# Patient Record
Sex: FEMALE | Race: WHITE | NOT HISPANIC OR LATINO
[De-identification: names, ages, dates, MRNs, and addresses within clinical notes are randomized per-mention and may not be internally consistent; named-entity substitution may affect disease eponyms.]

---

## 2018-02-09 ENCOUNTER — APPOINTMENT (OUTPATIENT)
Dept: ULTRASOUND IMAGING | Facility: CLINIC | Age: 43
End: 2018-02-09

## 2018-02-09 ENCOUNTER — APPOINTMENT (OUTPATIENT)
Dept: MAMMOGRAPHY | Facility: CLINIC | Age: 43
End: 2018-02-09

## 2018-03-02 ENCOUNTER — APPOINTMENT (OUTPATIENT)
Dept: MAMMOGRAPHY | Facility: CLINIC | Age: 43
End: 2018-03-02
Payer: COMMERCIAL

## 2018-03-02 ENCOUNTER — APPOINTMENT (OUTPATIENT)
Dept: ULTRASOUND IMAGING | Facility: CLINIC | Age: 43
End: 2018-03-02
Payer: COMMERCIAL

## 2018-03-02 ENCOUNTER — OUTPATIENT (OUTPATIENT)
Dept: OUTPATIENT SERVICES | Facility: HOSPITAL | Age: 43
LOS: 1 days | End: 2018-03-02
Payer: COMMERCIAL

## 2018-03-02 DIAGNOSIS — Z00.8 ENCOUNTER FOR OTHER GENERAL EXAMINATION: ICD-10-CM

## 2018-03-02 PROCEDURE — 76641 ULTRASOUND BREAST COMPLETE: CPT

## 2018-03-02 PROCEDURE — 77067 SCR MAMMO BI INCL CAD: CPT

## 2018-03-02 PROCEDURE — 77067 SCR MAMMO BI INCL CAD: CPT | Mod: 26

## 2018-03-02 PROCEDURE — 77063 BREAST TOMOSYNTHESIS BI: CPT | Mod: 26

## 2018-03-02 PROCEDURE — 76641 ULTRASOUND BREAST COMPLETE: CPT | Mod: 26,50

## 2018-03-02 PROCEDURE — 77063 BREAST TOMOSYNTHESIS BI: CPT

## 2019-03-21 ENCOUNTER — APPOINTMENT (OUTPATIENT)
Dept: MAMMOGRAPHY | Facility: CLINIC | Age: 44
End: 2019-03-21
Payer: COMMERCIAL

## 2019-03-21 ENCOUNTER — OUTPATIENT (OUTPATIENT)
Dept: OUTPATIENT SERVICES | Facility: HOSPITAL | Age: 44
LOS: 1 days | End: 2019-03-21
Payer: COMMERCIAL

## 2019-03-21 ENCOUNTER — APPOINTMENT (OUTPATIENT)
Dept: ULTRASOUND IMAGING | Facility: CLINIC | Age: 44
End: 2019-03-21
Payer: COMMERCIAL

## 2019-03-21 DIAGNOSIS — Z00.8 ENCOUNTER FOR OTHER GENERAL EXAMINATION: ICD-10-CM

## 2019-03-21 PROCEDURE — 76641 ULTRASOUND BREAST COMPLETE: CPT | Mod: 26,50

## 2019-03-21 PROCEDURE — 77067 SCR MAMMO BI INCL CAD: CPT

## 2019-03-21 PROCEDURE — 77067 SCR MAMMO BI INCL CAD: CPT | Mod: 26

## 2019-03-21 PROCEDURE — 77063 BREAST TOMOSYNTHESIS BI: CPT

## 2019-03-21 PROCEDURE — 76641 ULTRASOUND BREAST COMPLETE: CPT

## 2019-03-21 PROCEDURE — 77063 BREAST TOMOSYNTHESIS BI: CPT | Mod: 26

## 2020-05-28 ENCOUNTER — OUTPATIENT (OUTPATIENT)
Dept: OUTPATIENT SERVICES | Facility: HOSPITAL | Age: 45
LOS: 1 days | End: 2020-05-28
Payer: COMMERCIAL

## 2020-05-28 ENCOUNTER — APPOINTMENT (OUTPATIENT)
Dept: MAMMOGRAPHY | Facility: CLINIC | Age: 45
End: 2020-05-28

## 2020-05-28 ENCOUNTER — APPOINTMENT (OUTPATIENT)
Dept: ULTRASOUND IMAGING | Facility: CLINIC | Age: 45
End: 2020-05-28

## 2020-05-28 DIAGNOSIS — Z00.8 ENCOUNTER FOR OTHER GENERAL EXAMINATION: ICD-10-CM

## 2020-05-28 PROCEDURE — 76641 ULTRASOUND BREAST COMPLETE: CPT | Mod: 26,50

## 2020-05-28 PROCEDURE — 77063 BREAST TOMOSYNTHESIS BI: CPT

## 2020-05-28 PROCEDURE — 76641 ULTRASOUND BREAST COMPLETE: CPT

## 2020-05-28 PROCEDURE — 77063 BREAST TOMOSYNTHESIS BI: CPT | Mod: 26

## 2020-05-28 PROCEDURE — 77067 SCR MAMMO BI INCL CAD: CPT

## 2020-05-28 PROCEDURE — 77067 SCR MAMMO BI INCL CAD: CPT | Mod: 26

## 2020-10-21 ENCOUNTER — NON-APPOINTMENT (OUTPATIENT)
Age: 45
End: 2020-10-21

## 2021-02-23 ENCOUNTER — TRANSCRIPTION ENCOUNTER (OUTPATIENT)
Age: 46
End: 2021-02-23

## 2021-05-27 ENCOUNTER — OUTPATIENT (OUTPATIENT)
Dept: OUTPATIENT SERVICES | Facility: HOSPITAL | Age: 46
LOS: 1 days | End: 2021-05-27
Payer: COMMERCIAL

## 2021-05-27 ENCOUNTER — APPOINTMENT (OUTPATIENT)
Dept: MRI IMAGING | Facility: CLINIC | Age: 46
End: 2021-05-27
Payer: COMMERCIAL

## 2021-05-27 DIAGNOSIS — Z00.8 ENCOUNTER FOR OTHER GENERAL EXAMINATION: ICD-10-CM

## 2021-05-27 PROCEDURE — A9585: CPT

## 2021-05-27 PROCEDURE — 70553 MRI BRAIN STEM W/O & W/DYE: CPT | Mod: 26

## 2021-05-27 PROCEDURE — 70553 MRI BRAIN STEM W/O & W/DYE: CPT

## 2021-06-03 ENCOUNTER — APPOINTMENT (OUTPATIENT)
Dept: MAMMOGRAPHY | Facility: CLINIC | Age: 46
End: 2021-06-03
Payer: COMMERCIAL

## 2021-06-03 ENCOUNTER — OUTPATIENT (OUTPATIENT)
Dept: OUTPATIENT SERVICES | Facility: HOSPITAL | Age: 46
LOS: 1 days | End: 2021-06-03
Payer: COMMERCIAL

## 2021-06-03 ENCOUNTER — TRANSCRIPTION ENCOUNTER (OUTPATIENT)
Age: 46
End: 2021-06-03

## 2021-06-03 ENCOUNTER — APPOINTMENT (OUTPATIENT)
Dept: ULTRASOUND IMAGING | Facility: CLINIC | Age: 46
End: 2021-06-03
Payer: COMMERCIAL

## 2021-06-03 DIAGNOSIS — Z00.8 ENCOUNTER FOR OTHER GENERAL EXAMINATION: ICD-10-CM

## 2021-06-03 PROCEDURE — 76641 ULTRASOUND BREAST COMPLETE: CPT | Mod: 26,50

## 2021-06-03 PROCEDURE — 77067 SCR MAMMO BI INCL CAD: CPT | Mod: 26

## 2021-06-03 PROCEDURE — 77063 BREAST TOMOSYNTHESIS BI: CPT

## 2021-06-03 PROCEDURE — 77067 SCR MAMMO BI INCL CAD: CPT

## 2021-06-03 PROCEDURE — 76641 ULTRASOUND BREAST COMPLETE: CPT

## 2021-06-03 PROCEDURE — 77063 BREAST TOMOSYNTHESIS BI: CPT | Mod: 26

## 2021-09-17 ENCOUNTER — APPOINTMENT (OUTPATIENT)
Dept: INTERNAL MEDICINE | Facility: CLINIC | Age: 46
End: 2021-09-17

## 2021-10-27 ENCOUNTER — APPOINTMENT (OUTPATIENT)
Dept: INTERNAL MEDICINE | Facility: CLINIC | Age: 46
End: 2021-10-27
Payer: COMMERCIAL

## 2021-10-27 ENCOUNTER — LABORATORY RESULT (OUTPATIENT)
Age: 46
End: 2021-10-27

## 2021-10-27 ENCOUNTER — NON-APPOINTMENT (OUTPATIENT)
Age: 46
End: 2021-10-27

## 2021-10-27 VITALS
DIASTOLIC BLOOD PRESSURE: 96 MMHG | SYSTOLIC BLOOD PRESSURE: 136 MMHG | OXYGEN SATURATION: 98 % | HEART RATE: 76 BPM | WEIGHT: 222 LBS | HEIGHT: 67 IN | BODY MASS INDEX: 34.84 KG/M2

## 2021-10-27 PROCEDURE — 36415 COLL VENOUS BLD VENIPUNCTURE: CPT

## 2021-10-27 PROCEDURE — 96127 BRIEF EMOTIONAL/BEHAV ASSMT: CPT

## 2021-10-27 PROCEDURE — 99396 PREV VISIT EST AGE 40-64: CPT | Mod: 25

## 2021-10-27 PROCEDURE — 93000 ELECTROCARDIOGRAM COMPLETE: CPT

## 2021-10-27 NOTE — HEALTH RISK ASSESSMENT
[No] : In the past 12 months have you used drugs other than those required for medical reasons? No [0] : 2) Feeling down, depressed, or hopeless: Not at all (0) [Patient reported mammogram was normal] : Patient reported mammogram was normal [Patient reported PAP Smear was normal] : Patient reported PAP Smear was normal [No falls in past year] : Patient reported no falls in the past year [PHQ-2 Negative - No further assessment needed] : PHQ-2 Negative - No further assessment needed [I have developed a follow-up plan documented below in the note.] : I have developed a follow-up plan documented below in the note. [None] : None [With Family] : lives with family [] :  [Fully functional (bathing, dressing, toileting, transferring, walking, feeding)] : Fully functional (bathing, dressing, toileting, transferring, walking, feeding) [Fully functional (using the telephone, shopping, preparing meals, housekeeping, doing laundry, using] : Fully functional and needs no help or supervision to perform IADLs (using the telephone, shopping, preparing meals, housekeeping, doing laundry, using transportation, managing medications and managing finances) [Smoke Detector] : smoke detector [Carbon Monoxide Detector] : carbon monoxide detector [Seat Belt] :  uses seat belt [Sunscreen] : uses sunscreen [With Patient/Caregiver] : , with patient/caregiver [] : No [de-identified] : walking [de-identified] : reg [[id number]] : 0 [EyeExamDate] : 08/2021 [Change in mental status noted] : No change in mental status noted [Reports changes in dental health] : Reports no changes in dental health [MammogramDate] : 06/2021 [PapSmearDate] : 06/2021 [AdvancecareDate] : 10/27/21

## 2021-10-28 ENCOUNTER — NON-APPOINTMENT (OUTPATIENT)
Age: 46
End: 2021-10-28

## 2021-10-28 ENCOUNTER — TRANSCRIPTION ENCOUNTER (OUTPATIENT)
Age: 46
End: 2021-10-28

## 2021-10-28 LAB
25(OH)D3 SERPL-MCNC: 43 NG/ML
ALBUMIN SERPL ELPH-MCNC: 4.5 G/DL
ALP BLD-CCNC: 69 U/L
ALT SERPL-CCNC: 10 U/L
ANION GAP SERPL CALC-SCNC: 11 MMOL/L
APPEARANCE: CLEAR
AST SERPL-CCNC: 15 U/L
B BURGDOR IGG+IGM SER QL IB: NORMAL
BACTERIA: NEGATIVE
BASOPHILS # BLD AUTO: 0.03 K/UL
BASOPHILS NFR BLD AUTO: 0.4 %
BILIRUB SERPL-MCNC: 0.4 MG/DL
BILIRUBIN URINE: NEGATIVE
BLOOD URINE: NEGATIVE
BUN SERPL-MCNC: 14 MG/DL
CALCIUM SERPL-MCNC: 9.7 MG/DL
CHLORIDE SERPL-SCNC: 106 MMOL/L
CHOLEST SERPL-MCNC: 209 MG/DL
CO2 SERPL-SCNC: 22 MMOL/L
COLOR: YELLOW
CREAT SERPL-MCNC: 0.99 MG/DL
EOSINOPHIL # BLD AUTO: 0.05 K/UL
EOSINOPHIL NFR BLD AUTO: 0.7 %
FSH SERPL-MCNC: 4.6 IU/L
GLUCOSE QUALITATIVE U: NEGATIVE
GLUCOSE SERPL-MCNC: 86 MG/DL
HCG SERPL QL: NEGATIVE
HCT VFR BLD CALC: 41.3 %
HDLC SERPL-MCNC: 61 MG/DL
HGB BLD-MCNC: 13.6 G/DL
HYALINE CASTS: 2 /LPF
IMM GRANULOCYTES NFR BLD AUTO: 0.1 %
KETONES URINE: NEGATIVE
LDLC SERPL CALC-MCNC: 118 MG/DL
LEUKOCYTE ESTERASE URINE: NEGATIVE
LYMPHOCYTES # BLD AUTO: 1.84 K/UL
LYMPHOCYTES NFR BLD AUTO: 26.3 %
MAN DIFF?: NORMAL
MCHC RBC-ENTMCNC: 29.2 PG
MCHC RBC-ENTMCNC: 32.9 GM/DL
MCV RBC AUTO: 88.6 FL
MICROSCOPIC-UA: NORMAL
MONOCYTES # BLD AUTO: 0.34 K/UL
MONOCYTES NFR BLD AUTO: 4.9 %
NEUTROPHILS # BLD AUTO: 4.72 K/UL
NEUTROPHILS NFR BLD AUTO: 67.6 %
NITRITE URINE: NEGATIVE
NONHDLC SERPL-MCNC: 149 MG/DL
PAPP-A SERPL-ACNC: <1 MIU/ML
PH URINE: 5.5
PLATELET # BLD AUTO: 293 K/UL
POTASSIUM SERPL-SCNC: 4.6 MMOL/L
PROGEST SERPL-MCNC: 0.2 NG/ML
PROT SERPL-MCNC: 7 G/DL
PROTEIN URINE: NEGATIVE
RBC # BLD: 4.66 M/UL
RBC # FLD: 12.8 %
RED BLOOD CELLS URINE: 1 /HPF
SODIUM SERPL-SCNC: 139 MMOL/L
SPECIFIC GRAVITY URINE: 1.02
SQUAMOUS EPITHELIAL CELLS: 2 /HPF
T4 SERPL-MCNC: 7.5 UG/DL
TRIGL SERPL-MCNC: 155 MG/DL
TSH SERPL-ACNC: 1.41 UIU/ML
UROBILINOGEN URINE: NORMAL
WBC # FLD AUTO: 6.99 K/UL
WHITE BLOOD CELLS URINE: 1 /HPF

## 2021-10-31 LAB
HCV AB SER QL: NONREACTIVE
HCV S/CO RATIO: 0.12 S/CO

## 2021-11-01 LAB — ESTROGEN SERPL-MCNC: 52 PG/ML

## 2021-12-09 ENCOUNTER — APPOINTMENT (OUTPATIENT)
Dept: INTERNAL MEDICINE | Facility: CLINIC | Age: 46
End: 2021-12-09

## 2021-12-21 DIAGNOSIS — Z78.9 OTHER SPECIFIED HEALTH STATUS: ICD-10-CM

## 2022-01-05 ENCOUNTER — APPOINTMENT (OUTPATIENT)
Dept: INTERNAL MEDICINE | Facility: CLINIC | Age: 47
End: 2022-01-05
Payer: COMMERCIAL

## 2022-01-05 VITALS — SYSTOLIC BLOOD PRESSURE: 136 MMHG | DIASTOLIC BLOOD PRESSURE: 84 MMHG

## 2022-01-05 VITALS
HEART RATE: 82 BPM | SYSTOLIC BLOOD PRESSURE: 120 MMHG | WEIGHT: 222 LBS | BODY MASS INDEX: 34.84 KG/M2 | DIASTOLIC BLOOD PRESSURE: 78 MMHG | HEIGHT: 67 IN | OXYGEN SATURATION: 99 %

## 2022-01-05 PROCEDURE — 99214 OFFICE O/P EST MOD 30 MIN: CPT

## 2022-01-05 RX ORDER — NORETHINDRONE ACETATE AND ETHINYL ESTRADIOL AND FERROUS FUMARATE 1MG-20(21)
1-20 KIT ORAL
Qty: 28 | Refills: 5 | Status: DISCONTINUED | COMMUNITY
Start: 2021-10-27 | End: 2022-01-05

## 2022-01-05 NOTE — HISTORY OF PRESENT ILLNESS
[FreeTextEntry1] : Pt here for follow up on blood pressure tolerates meds  no headaches no  dizziness

## 2022-02-03 ENCOUNTER — TRANSCRIPTION ENCOUNTER (OUTPATIENT)
Age: 47
End: 2022-02-03

## 2022-02-03 ENCOUNTER — NON-APPOINTMENT (OUTPATIENT)
Age: 47
End: 2022-02-03

## 2022-02-03 ENCOUNTER — APPOINTMENT (OUTPATIENT)
Dept: FAMILY MEDICINE | Facility: CLINIC | Age: 47
End: 2022-02-03

## 2022-02-17 ENCOUNTER — RX RENEWAL (OUTPATIENT)
Age: 47
End: 2022-02-17

## 2022-03-01 ENCOUNTER — APPOINTMENT (OUTPATIENT)
Dept: FAMILY MEDICINE | Facility: CLINIC | Age: 47
End: 2022-03-01

## 2022-03-01 ENCOUNTER — TRANSCRIPTION ENCOUNTER (OUTPATIENT)
Age: 47
End: 2022-03-01

## 2022-03-04 ENCOUNTER — TRANSCRIPTION ENCOUNTER (OUTPATIENT)
Age: 47
End: 2022-03-04

## 2022-03-08 ENCOUNTER — NON-APPOINTMENT (OUTPATIENT)
Age: 47
End: 2022-03-08

## 2022-03-08 ENCOUNTER — TRANSCRIPTION ENCOUNTER (OUTPATIENT)
Age: 47
End: 2022-03-08

## 2022-03-29 ENCOUNTER — APPOINTMENT (OUTPATIENT)
Dept: INTERNAL MEDICINE | Facility: CLINIC | Age: 47
End: 2022-03-29
Payer: COMMERCIAL

## 2022-03-29 VITALS
TEMPERATURE: 98 F | SYSTOLIC BLOOD PRESSURE: 134 MMHG | HEART RATE: 80 BPM | DIASTOLIC BLOOD PRESSURE: 74 MMHG | OXYGEN SATURATION: 99 % | HEIGHT: 67 IN | BODY MASS INDEX: 35.63 KG/M2 | WEIGHT: 227 LBS

## 2022-03-29 VITALS — SYSTOLIC BLOOD PRESSURE: 128 MMHG | DIASTOLIC BLOOD PRESSURE: 72 MMHG

## 2022-03-29 PROCEDURE — 99214 OFFICE O/P EST MOD 30 MIN: CPT

## 2022-03-29 RX ORDER — DESOGESTREL/ETHINYL ESTRADIOL AND ETHINYL ESTRADIOL 21-5 (28)
KIT ORAL
Refills: 0 | Status: ACTIVE | COMMUNITY

## 2022-06-29 ENCOUNTER — APPOINTMENT (OUTPATIENT)
Dept: INTERNAL MEDICINE | Facility: CLINIC | Age: 47
End: 2022-06-29
Payer: COMMERCIAL

## 2022-06-29 VITALS
WEIGHT: 231 LBS | HEART RATE: 63 BPM | SYSTOLIC BLOOD PRESSURE: 122 MMHG | TEMPERATURE: 98.5 F | DIASTOLIC BLOOD PRESSURE: 70 MMHG | OXYGEN SATURATION: 99 % | HEIGHT: 67 IN | BODY MASS INDEX: 36.26 KG/M2

## 2022-06-29 VITALS — SYSTOLIC BLOOD PRESSURE: 132 MMHG | DIASTOLIC BLOOD PRESSURE: 80 MMHG

## 2022-06-29 PROCEDURE — 36415 COLL VENOUS BLD VENIPUNCTURE: CPT

## 2022-06-29 PROCEDURE — 99214 OFFICE O/P EST MOD 30 MIN: CPT | Mod: 25

## 2022-06-29 NOTE — HISTORY OF PRESENT ILLNESS
[FreeTextEntry1] : Pt is here for follow up of multiple medical problems including\par htn,elevated bmi   and anxiety

## 2022-07-12 ENCOUNTER — LABORATORY RESULT (OUTPATIENT)
Age: 47
End: 2022-07-12

## 2022-07-13 ENCOUNTER — NON-APPOINTMENT (OUTPATIENT)
Age: 47
End: 2022-07-13

## 2022-07-13 LAB
ALBUMIN SERPL ELPH-MCNC: 4.3 G/DL
ANION GAP SERPL CALC-SCNC: 13 MMOL/L
BUN SERPL-MCNC: 14 MG/DL
CALCIUM SERPL-MCNC: 9.3 MG/DL
CCP AB SER IA-ACNC: <8 UNITS
CHLORIDE SERPL-SCNC: 104 MMOL/L
CO2 SERPL-SCNC: 20 MMOL/L
CREAT SERPL-MCNC: 0.97 MG/DL
CRP SERPL-MCNC: 5 MG/L
EGFR: 73 ML/MIN/1.73M2
ERYTHROCYTE [SEDIMENTATION RATE] IN BLOOD BY WESTERGREN METHOD: 16 MM/HR
ESTIMATED AVERAGE GLUCOSE: 117 MG/DL
GLUCOSE SERPL-MCNC: 71 MG/DL
HBA1C MFR BLD HPLC: 5.7 %
PHOSPHATE SERPL-MCNC: 2.9 MG/DL
POTASSIUM SERPL-SCNC: 4.3 MMOL/L
RF+CCP IGG SER-IMP: NEGATIVE
RHEUMATOID FACT SER QL: <10 IU/ML
SODIUM SERPL-SCNC: 137 MMOL/L

## 2022-07-17 LAB
ANA PAT FLD IF-IMP: ABNORMAL
ANA SER IF-ACNC: ABNORMAL

## 2022-07-27 ENCOUNTER — RX RENEWAL (OUTPATIENT)
Age: 47
End: 2022-07-27

## 2022-07-29 ENCOUNTER — OUTPATIENT (OUTPATIENT)
Dept: OUTPATIENT SERVICES | Facility: HOSPITAL | Age: 47
LOS: 1 days | End: 2022-07-29
Payer: COMMERCIAL

## 2022-07-29 ENCOUNTER — APPOINTMENT (OUTPATIENT)
Dept: MAMMOGRAPHY | Facility: CLINIC | Age: 47
End: 2022-07-29

## 2022-07-29 ENCOUNTER — APPOINTMENT (OUTPATIENT)
Dept: ULTRASOUND IMAGING | Facility: CLINIC | Age: 47
End: 2022-07-29

## 2022-07-29 DIAGNOSIS — Z00.8 ENCOUNTER FOR OTHER GENERAL EXAMINATION: ICD-10-CM

## 2022-07-29 PROCEDURE — 76641 ULTRASOUND BREAST COMPLETE: CPT | Mod: 26,50

## 2022-07-29 PROCEDURE — 77063 BREAST TOMOSYNTHESIS BI: CPT

## 2022-07-29 PROCEDURE — 77063 BREAST TOMOSYNTHESIS BI: CPT | Mod: 26

## 2022-07-29 PROCEDURE — 77067 SCR MAMMO BI INCL CAD: CPT

## 2022-07-29 PROCEDURE — 77067 SCR MAMMO BI INCL CAD: CPT | Mod: 26

## 2022-07-29 PROCEDURE — 76641 ULTRASOUND BREAST COMPLETE: CPT

## 2022-08-17 ENCOUNTER — NON-APPOINTMENT (OUTPATIENT)
Age: 47
End: 2022-08-17

## 2022-10-31 ENCOUNTER — RX RENEWAL (OUTPATIENT)
Age: 47
End: 2022-10-31

## 2022-11-03 ENCOUNTER — NON-APPOINTMENT (OUTPATIENT)
Age: 47
End: 2022-11-03

## 2022-11-03 ENCOUNTER — APPOINTMENT (OUTPATIENT)
Dept: INTERNAL MEDICINE | Facility: CLINIC | Age: 47
End: 2022-11-03

## 2022-11-03 VITALS
WEIGHT: 230 LBS | DIASTOLIC BLOOD PRESSURE: 78 MMHG | HEIGHT: 67 IN | HEART RATE: 84 BPM | SYSTOLIC BLOOD PRESSURE: 120 MMHG | TEMPERATURE: 98.4 F | OXYGEN SATURATION: 99 % | BODY MASS INDEX: 36.1 KG/M2

## 2022-11-03 DIAGNOSIS — N91.2 AMENORRHEA, UNSPECIFIED: ICD-10-CM

## 2022-11-03 DIAGNOSIS — Z80.6 FAMILY HISTORY OF LEUKEMIA: ICD-10-CM

## 2022-11-03 DIAGNOSIS — Z80.42 FAMILY HISTORY OF MALIGNANT NEOPLASM OF PROSTATE: ICD-10-CM

## 2022-11-03 DIAGNOSIS — U07.1 COVID-19: ICD-10-CM

## 2022-11-03 DIAGNOSIS — M25.50 PAIN IN UNSPECIFIED JOINT: ICD-10-CM

## 2022-11-03 DIAGNOSIS — R42 DIZZINESS AND GIDDINESS: ICD-10-CM

## 2022-11-03 PROCEDURE — G0444 DEPRESSION SCREEN ANNUAL: CPT | Mod: NC,59

## 2022-11-03 PROCEDURE — 90715 TDAP VACCINE 7 YRS/> IM: CPT

## 2022-11-03 PROCEDURE — 93000 ELECTROCARDIOGRAM COMPLETE: CPT | Mod: 59

## 2022-11-03 PROCEDURE — 36415 COLL VENOUS BLD VENIPUNCTURE: CPT

## 2022-11-03 PROCEDURE — 90471 IMMUNIZATION ADMIN: CPT

## 2022-11-03 PROCEDURE — 99396 PREV VISIT EST AGE 40-64: CPT | Mod: 25

## 2022-11-03 NOTE — HEALTH RISK ASSESSMENT
[Never] : Never [No] : In the past 12 months have you used drugs other than those required for medical reasons? No [0] : 2) Feeling down, depressed, or hopeless: Not at all (0) [No falls in past year] : Patient reported no falls in the past year [PHQ-2 Negative - No further assessment needed] : PHQ-2 Negative - No further assessment needed [None] : None [With Family] : lives with family [Smoke Detector] : smoke detector [Carbon Monoxide Detector] : carbon monoxide detector [Seat Belt] :  uses seat belt [Sunscreen] : uses sunscreen [With Patient/Caregiver] : , with patient/caregiver [de-identified] : kick boxing [de-identified] : reg [[id number]] : 0 [EyeExamDate] : 08/2022 [Change in mental status noted] : No change in mental status noted [MammogramDate] : 04/2022 [PapSmearDate] : 04/2022 [AdvancecareDate] : 11/3/22

## 2022-11-06 LAB
25(OH)D3 SERPL-MCNC: 39.1 NG/ML
ALBUMIN SERPL ELPH-MCNC: 4.4 G/DL
ALP BLD-CCNC: 74 U/L
ALT SERPL-CCNC: 11 U/L
ANION GAP SERPL CALC-SCNC: 10 MMOL/L
APPEARANCE: CLEAR
AST SERPL-CCNC: 13 U/L
BACTERIA: NEGATIVE
BASOPHILS # BLD AUTO: 0.02 K/UL
BASOPHILS NFR BLD AUTO: 0.3 %
BILIRUB DIRECT SERPL-MCNC: 0.1 MG/DL
BILIRUB SERPL-MCNC: 0.4 MG/DL
BILIRUBIN URINE: NEGATIVE
BLOOD URINE: NEGATIVE
BUN SERPL-MCNC: 13 MG/DL
CALCIUM SERPL-MCNC: 9.4 MG/DL
CHLORIDE SERPL-SCNC: 103 MMOL/L
CHOLEST SERPL-MCNC: 194 MG/DL
CO2 SERPL-SCNC: 23 MMOL/L
COLOR: NORMAL
CREAT SERPL-MCNC: 0.95 MG/DL
EGFR: 75 ML/MIN/1.73M2
EOSINOPHIL # BLD AUTO: 0.03 K/UL
EOSINOPHIL NFR BLD AUTO: 0.5 %
ESTIMATED AVERAGE GLUCOSE: 111 MG/DL
FERRITIN SERPL-MCNC: 52 NG/ML
GLUCOSE QUALITATIVE U: NEGATIVE
GLUCOSE SERPL-MCNC: 86 MG/DL
HBA1C MFR BLD HPLC: 5.5 %
HCT VFR BLD CALC: 40.9 %
HCV AB SER QL: NONREACTIVE
HCV S/CO RATIO: 0.14 S/CO
HDLC SERPL-MCNC: 54 MG/DL
HGB BLD-MCNC: 13.3 G/DL
HYALINE CASTS: 2 /LPF
IMM GRANULOCYTES NFR BLD AUTO: 0.3 %
IRON SERPL-MCNC: 94 UG/DL
KETONES URINE: NEGATIVE
LDLC SERPL CALC-MCNC: 111 MG/DL
LEUKOCYTE ESTERASE URINE: NEGATIVE
LYMPHOCYTES # BLD AUTO: 2.16 K/UL
LYMPHOCYTES NFR BLD AUTO: 34.1 %
MAN DIFF?: NORMAL
MCHC RBC-ENTMCNC: 28.7 PG
MCHC RBC-ENTMCNC: 32.5 GM/DL
MCV RBC AUTO: 88.1 FL
MICROSCOPIC-UA: NORMAL
MONOCYTES # BLD AUTO: 0.34 K/UL
MONOCYTES NFR BLD AUTO: 5.4 %
NEUTROPHILS # BLD AUTO: 3.76 K/UL
NEUTROPHILS NFR BLD AUTO: 59.4 %
NITRITE URINE: NEGATIVE
NONHDLC SERPL-MCNC: 141 MG/DL
PH URINE: 6
PLATELET # BLD AUTO: 281 K/UL
POTASSIUM SERPL-SCNC: 4.4 MMOL/L
PROT SERPL-MCNC: 6.6 G/DL
PROTEIN URINE: NEGATIVE
RBC # BLD: 4.64 M/UL
RBC # FLD: 12.6 %
RED BLOOD CELLS URINE: 1 /HPF
SODIUM SERPL-SCNC: 137 MMOL/L
SPECIFIC GRAVITY URINE: 1.02
SQUAMOUS EPITHELIAL CELLS: 2 /HPF
T4 SERPL-MCNC: 6.9 UG/DL
TRIGL SERPL-MCNC: 148 MG/DL
TSH SERPL-ACNC: 1.81 UIU/ML
UROBILINOGEN URINE: NORMAL
WBC # FLD AUTO: 6.33 K/UL
WHITE BLOOD CELLS URINE: 2 /HPF

## 2022-11-09 ENCOUNTER — NON-APPOINTMENT (OUTPATIENT)
Age: 47
End: 2022-11-09

## 2022-11-21 ENCOUNTER — RESULT REVIEW (OUTPATIENT)
Age: 47
End: 2022-11-21

## 2022-12-19 ENCOUNTER — APPOINTMENT (OUTPATIENT)
Dept: RHEUMATOLOGY | Facility: CLINIC | Age: 47
End: 2022-12-19

## 2022-12-19 VITALS
OXYGEN SATURATION: 99 % | BODY MASS INDEX: 34.84 KG/M2 | TEMPERATURE: 97.1 F | WEIGHT: 222 LBS | DIASTOLIC BLOOD PRESSURE: 70 MMHG | HEART RATE: 104 BPM | SYSTOLIC BLOOD PRESSURE: 110 MMHG | HEIGHT: 67 IN

## 2022-12-19 DIAGNOSIS — M54.2 CERVICALGIA: ICD-10-CM

## 2022-12-19 DIAGNOSIS — R79.82 ELEVATED C-REACTIVE PROTEIN (CRP): ICD-10-CM

## 2022-12-19 DIAGNOSIS — M54.50 LOW BACK PAIN, UNSPECIFIED: ICD-10-CM

## 2022-12-19 DIAGNOSIS — M25.649 STIFFNESS OF UNSPECIFIED HAND, NOT ELSEWHERE CLASSIFIED: ICD-10-CM

## 2022-12-19 DIAGNOSIS — M19.041 PRIMARY OSTEOARTHRITIS, RIGHT HAND: ICD-10-CM

## 2022-12-19 DIAGNOSIS — M23.8X1 OTHER INTERNAL DERANGEMENTS OF RIGHT KNEE: ICD-10-CM

## 2022-12-19 DIAGNOSIS — M19.042 PRIMARY OSTEOARTHRITIS, RIGHT HAND: ICD-10-CM

## 2022-12-19 DIAGNOSIS — M25.569 PAIN IN UNSPECIFIED KNEE: ICD-10-CM

## 2022-12-19 DIAGNOSIS — M23.8X2 OTHER INTERNAL DERANGEMENTS OF RIGHT KNEE: ICD-10-CM

## 2022-12-19 DIAGNOSIS — R70.0 ELEVATED ERYTHROCYTE SEDIMENTATION RATE: ICD-10-CM

## 2022-12-19 PROCEDURE — 99205 OFFICE O/P NEW HI 60 MIN: CPT

## 2022-12-19 NOTE — PHYSICAL EXAM
[General Appearance - Alert] : alert [General Appearance - In No Acute Distress] : in no acute distress [Sclera] : the sclera and conjunctiva were normal [Extraocular Movements] : extraocular movements were intact [Outer Ear] : the ears and nose were normal in appearance [Neck Appearance] : the appearance of the neck was normal [Respiration, Rhythm And Depth] : normal respiratory rhythm and effort [Heart Rate And Rhythm] : heart rate was normal and rhythm regular [Heart Sounds] : normal S1 and S2 [Abdomen Soft] : soft [Abdomen Tenderness] : non-tender [Cervical Lymph Nodes Enlarged Anterior Bilaterally] : anterior cervical [Supraclavicular Lymph Nodes Enlarged Bilaterally] : supraclavicular [No CVA Tenderness] : no ~M costovertebral angle tenderness [Motor Tone] : muscle strength and tone were normal [] : no rash [No Focal Deficits] : no focal deficits [Impaired Insight] : insight and judgment were intact [Mood] : the mood was normal [FreeTextEntry1] : mild heberden node at DIPs; No synovitis or effusion on exam noted today; Good ROM in b/l shoulders, no pelvic/girdle stiffness and able to stand up without using her hands

## 2022-12-19 NOTE — ASSESSMENT
[FreeTextEntry1] : 47-year-old female, here for the first time w/ mild OA hands; w/ +GINGER 1:160 Speckled; mildly high ESR=16; mildly high CRP=5 reports of intermittent stiffness in the b/l hands, knees, neck and LBP to rule out inflammatory arthropathy incld RA, seronegative spondyloarthropathy.\par -No synovitis or effusion on exam noted today and advised to monitor.\par -reviewed labs 11/3/22 CMP ok; 7/12/22 w/ +GINGER 1:160 Speckled; mildly high ESR=16; mildly high CRP=5; RF negative; CCP negative; lyme IgM Negative; lyme IgG + (reports she was treated for lyme in the past)\par -labs as below incld ESR, CRP, serologies, TSH\par -Referred for xray of b/l hands to evaluate for structural changes, r/o periarticular osteopenia/RA, r/o erosions\par \par +GINGER 1:160 Speckled\par -Clinically without much symptoms of CTD/lupus/sjogren syndrome at this time and educated on symptoms to monitor for in detail if she evolves.\par -lab as below w/ disease activity markers as below to be complete\par -will check thyroid abs as discussed +GINGER can be seen with cross reactivity\par \par Mildly Raised ESR/CRP\par -denies any signs of infection now; no synovitis; denies any wt loss or night sweats and knows to stay up to date on health maintenance w/ PCP please \par -will monitor on repeat\par \par Cervicalgia: tenderness in c-spine w/ rotation w/ good ROM w/o paresthesias \par -Referred for xray of c-spine to evaluate for structural changes, DDD; muscle spasm \par \par Intermittent Knee pain -has crepitus in b/l knees and referred for xray of b/l knees to evaluate for structural changes, OA\par -consider steroid injections if pain \par \par LBP: intermittent \par -Referred for xray of L spine & SI to evaluate for structural changes, DDD, confirm SI normal \par -Advil PRN w/ food needed sparingly helps\par \par -educated on symptoms to monitor for in detail and alert us if any concerns.\par -knows to stay up to date on health maintenance w/ PCP\par -f/u in 10-14 days w/ labs, xrays please\par

## 2022-12-19 NOTE — HISTORY OF PRESENT ILLNESS
[FreeTextEntry1] : 47-year-old female, here for the first time with +GINGER 1:160 Speckled; mildly high ESR=16; mildly high CRP=5.\par Patient states she can notice some intermittent stiffness in her joints for the past 1-1/2 years.\par Patient states she can notice some intermittent stiffness in her bilateral hands that is not on a daily basis.\par States the stiffness is less than 30 minutes.\par States she will monitor for any swelling of the joints.\par States she can notice intermittent neck pain with rotation without paresthesias at this time.\par States she notices intermittent knee pain with going up and down the stairs.  Denies any crystal arthropathy like attacks.\par States she notices lower back pain worse with sitting or standing; better with resting. Denies any loss of bladder or bowel incontinence or saddle anesthesias.\par Denies any fever/chills, no rashes, no ulcers, no dry eyes, no dry mouth, no raynaud's, no infectious diarrhea or  symptoms at this time.\par Denies any history of blood clots; no stroke; 2 pregnancies; no miscarriages.

## 2022-12-21 LAB
ACE BLD-CCNC: 32 U/L
ALBUMIN SERPL ELPH-MCNC: 4.5 G/DL
ALP BLD-CCNC: 80 U/L
ALT SERPL-CCNC: 12 U/L
ANA SER IF-ACNC: NEGATIVE
ANION GAP SERPL CALC-SCNC: 12 MMOL/L
AST SERPL-CCNC: 16 U/L
BASOPHILS # BLD AUTO: 0.03 K/UL
BASOPHILS NFR BLD AUTO: 0.5 %
BILIRUB SERPL-MCNC: 0.3 MG/DL
BUN SERPL-MCNC: 19 MG/DL
C3 SERPL-MCNC: 152 MG/DL
C4 SERPL-MCNC: 25 MG/DL
CALCIUM SERPL-MCNC: 9.9 MG/DL
CCP AB SER IA-ACNC: <8 UNITS
CHLORIDE SERPL-SCNC: 101 MMOL/L
CO2 SERPL-SCNC: 24 MMOL/L
CREAT SERPL-MCNC: 0.91 MG/DL
CREAT SPEC-SCNC: 180 MG/DL
CREAT/PROT UR: 0.1 RATIO
CRP SERPL-MCNC: 11 MG/L
DSDNA AB SER-ACNC: <12 IU/ML
EGFR: 79 ML/MIN/1.73M2
ENA RNP AB SER IA-ACNC: <0.2 AL
ENA SM AB SER IA-ACNC: <0.2 AL
ENA SS-A AB SER IA-ACNC: <0.2 AL
ENA SS-B AB SER IA-ACNC: <0.2 AL
EOSINOPHIL # BLD AUTO: 0.04 K/UL
EOSINOPHIL NFR BLD AUTO: 0.6 %
ERYTHROCYTE [SEDIMENTATION RATE] IN BLOOD BY WESTERGREN METHOD: 23 MM/HR
GLUCOSE SERPL-MCNC: 91 MG/DL
HAV IGM SER QL: NONREACTIVE
HBV CORE IGM SER QL: NONREACTIVE
HBV SURFACE AG SER QL: NONREACTIVE
HCT VFR BLD CALC: 41.6 %
HCV AB SER QL: NONREACTIVE
HCV S/CO RATIO: 0.12 S/CO
HGB BLD-MCNC: 13.9 G/DL
HIV1+2 AB SPEC QL IA.RAPID: NONREACTIVE
IMM GRANULOCYTES NFR BLD AUTO: 0.2 %
LYMPHOCYTES # BLD AUTO: 1.6 K/UL
LYMPHOCYTES NFR BLD AUTO: 25.7 %
MAN DIFF?: NORMAL
MCHC RBC-ENTMCNC: 28.7 PG
MCHC RBC-ENTMCNC: 33.4 GM/DL
MCV RBC AUTO: 85.8 FL
MONOCYTES # BLD AUTO: 0.33 K/UL
MONOCYTES NFR BLD AUTO: 5.3 %
NEUTROPHILS # BLD AUTO: 4.21 K/UL
NEUTROPHILS NFR BLD AUTO: 67.7 %
PLATELET # BLD AUTO: 322 K/UL
POTASSIUM SERPL-SCNC: 4.7 MMOL/L
PROT SERPL-MCNC: 7.2 G/DL
PROT UR-MCNC: 14 MG/DL
RBC # BLD: 4.85 M/UL
RBC # FLD: 12.8 %
RF+CCP IGG SER-IMP: NEGATIVE
SODIUM SERPL-SCNC: 137 MMOL/L
THYROGLOB AB SERPL-ACNC: <20 IU/ML
THYROPEROXIDASE AB SERPL IA-ACNC: 14 IU/ML
TSH SERPL-ACNC: 1.18 UIU/ML
WBC # FLD AUTO: 6.22 K/UL

## 2022-12-22 LAB
B19V IGG SER QL IA: 5.31 INDEX
B19V IGG+IGM SER-IMP: NORMAL
B19V IGG+IGM SER-IMP: POSITIVE
B19V IGM FLD-ACNC: 0.13 INDEX
B19V IGM SER-ACNC: NEGATIVE

## 2023-01-03 LAB
G6PD SER-CCNC: 15 U/G HGB
HLA-B27 RELATED AG QL: NEGATIVE

## 2023-02-08 ENCOUNTER — RX RENEWAL (OUTPATIENT)
Age: 48
End: 2023-02-08

## 2023-02-10 ENCOUNTER — NON-APPOINTMENT (OUTPATIENT)
Age: 48
End: 2023-02-10

## 2023-02-13 ENCOUNTER — APPOINTMENT (OUTPATIENT)
Dept: OTOLARYNGOLOGY | Facility: CLINIC | Age: 48
End: 2023-02-13
Payer: COMMERCIAL

## 2023-02-13 ENCOUNTER — NON-APPOINTMENT (OUTPATIENT)
Age: 48
End: 2023-02-13

## 2023-02-13 VITALS
DIASTOLIC BLOOD PRESSURE: 75 MMHG | SYSTOLIC BLOOD PRESSURE: 131 MMHG | WEIGHT: 220 LBS | HEIGHT: 67 IN | HEART RATE: 102 BPM | BODY MASS INDEX: 34.53 KG/M2

## 2023-02-13 DIAGNOSIS — H93.293 OTHER ABNORMAL AUDITORY PERCEPTIONS, BILATERAL: ICD-10-CM

## 2023-02-13 DIAGNOSIS — H61.21 IMPACTED CERUMEN, RIGHT EAR: ICD-10-CM

## 2023-02-13 DIAGNOSIS — H93.19 TINNITUS, UNSPECIFIED EAR: ICD-10-CM

## 2023-02-13 PROCEDURE — 99204 OFFICE O/P NEW MOD 45 MIN: CPT | Mod: 25

## 2023-02-13 PROCEDURE — 92557 COMPREHENSIVE HEARING TEST: CPT

## 2023-02-13 PROCEDURE — G0268 REMOVAL OF IMPACTED WAX MD: CPT

## 2023-02-13 PROCEDURE — 92567 TYMPANOMETRY: CPT

## 2023-02-13 PROCEDURE — 92625 TINNITUS ASSESSMENT: CPT

## 2023-02-13 NOTE — REVIEW OF SYSTEMS
[Dizziness] : dizziness [Vertigo] : vertigo [Ear Noises] : ear noises [Heartburn] : heartburn [Negative] : Heme/Lymph

## 2023-02-13 NOTE — ASSESSMENT
[FreeTextEntry1] : 48-year-old female with long history of nonpulsatile tinnitus for the past 3 to 4 years.  Worsens with stress and lack of sleep.  Had no prior history of recurring ear infections or prior surgery.  Previously underwent MRI ordered by another ENT.\par \par Otoscopic exam today shows intact bilateral tympanic membranes without effusion or retraction.  Bilateral facial nerve function is normal.  I personally ordered and reviewed an audiogram for her hearing loss, which shows a mild high-frequency left asymmetric sensorineural hearing loss.  I personally reviewed and interpreted prior MRI images and the report, which shows no IAC or CPA pathology.\par \par The association between hearing loss and tinnitus was discussed with the patient.  The manner in which tinnitus can be worsened by stress, anxiety, lack of sleep, and caffeine was discussed.  Masking and habituation strategies were discussed.  Use of sound therapies such as hearing aids, noise generators, combination devices, and music therapies were discussed.  Tinnitus handout was provided.  Recommended trial of notched sound therapy, may also consider music therapies through the hearing and tinnitus center.  Monitor hearing every few years.\par \par

## 2023-02-13 NOTE — DATA REVIEWED
[de-identified] : -  TYPE A TYMPS AU\par RIGHT: HEARING -8000HZ\par LEFT: HEARING WNL SLOPING TO A MILD SNHL 250-8000HZ\par *LEFT ASYMM 4-8KHZ; DUTTON RIGHT 3-4KHZ C/W LEFT SNHL ASYMM\par PITCH MATCH AT 12.5KHZ ~55 dB; PT REPORT OCCASIONAL HIGHER PITCH AT ~16KHZ

## 2023-03-09 ENCOUNTER — APPOINTMENT (OUTPATIENT)
Dept: RHEUMATOLOGY | Facility: CLINIC | Age: 48
End: 2023-03-09

## 2023-04-19 ENCOUNTER — APPOINTMENT (OUTPATIENT)
Dept: INTERNAL MEDICINE | Facility: CLINIC | Age: 48
End: 2023-04-19
Payer: COMMERCIAL

## 2023-04-19 VITALS
HEIGHT: 67 IN | BODY MASS INDEX: 31.39 KG/M2 | WEIGHT: 200 LBS | SYSTOLIC BLOOD PRESSURE: 122 MMHG | HEART RATE: 100 BPM | OXYGEN SATURATION: 98 % | TEMPERATURE: 97.1 F | DIASTOLIC BLOOD PRESSURE: 74 MMHG

## 2023-04-19 DIAGNOSIS — Z87.898 PERSONAL HISTORY OF OTHER SPECIFIED CONDITIONS: ICD-10-CM

## 2023-04-19 PROCEDURE — 99214 OFFICE O/P EST MOD 30 MIN: CPT | Mod: 25

## 2023-04-19 PROCEDURE — 36415 COLL VENOUS BLD VENIPUNCTURE: CPT

## 2023-04-19 NOTE — ASSESSMENT
[FreeTextEntry1] : 1. HTn \par BP controlled\par Continue Metoprolol ER 25 m g po Qd\par Limit sodium intake 2 gr daily \par renal panel ordered\par \par 2. Obesity\par Encouraged to increase physical activity 150 minutes a week as tolerated \par Medical weight loss referral provided\par OV 3 months \par

## 2023-04-19 NOTE — HISTORY OF PRESENT ILLNESS
[FreeTextEntry1] : f/up  [de-identified] : Pt is a 48 yr old female with a h/ o of anxiety, GERD, vertigo, tinnitus, + GINGER. \par \par She recently saw rheumatology MD for + GINGER , per pt further workup was negative. \par She does experience stiffness in her hands at times , XRAY of nanci hands was ordered, pt declined. States it is " likely osteoarthritis from working on the computer. " \par \par She remains on Metoprolol ER 25 m g po Qd for elevated BP. Blood pressure today  in the office is 122/ 76 taken by myself. \par She denies headache, vision changes, lightheadedness. \par \par She is requesting referral for medical weight loss , she has been unable to lose weight . She does walk  occasionally and tried to watch what she eats.  \par Referred to [facility], provided contact information to pt. \par \par Pt denies fever, chills, chest pain, palpitations , Shortness  of breath.

## 2023-04-19 NOTE — REVIEW OF SYSTEMS
[Fever] : no fever [Chills] : no chills [Fatigue] : no fatigue [Joint Pain] : joint pain [Negative] : Neurological

## 2023-04-20 ENCOUNTER — NON-APPOINTMENT (OUTPATIENT)
Age: 48
End: 2023-04-20

## 2023-04-20 LAB
ALBUMIN SERPL ELPH-MCNC: 4.4 G/DL
ANION GAP SERPL CALC-SCNC: 13 MMOL/L
BUN SERPL-MCNC: 18 MG/DL
CALCIUM SERPL-MCNC: 9.7 MG/DL
CHLORIDE SERPL-SCNC: 103 MMOL/L
CO2 SERPL-SCNC: 23 MMOL/L
CREAT SERPL-MCNC: 0.94 MG/DL
EGFR: 76 ML/MIN/1.73M2
GLUCOSE SERPL-MCNC: 104 MG/DL
PHOSPHATE SERPL-MCNC: 3.4 MG/DL
POTASSIUM SERPL-SCNC: 4.6 MMOL/L
SODIUM SERPL-SCNC: 139 MMOL/L

## 2023-08-17 ENCOUNTER — APPOINTMENT (OUTPATIENT)
Dept: GASTROENTEROLOGY | Facility: CLINIC | Age: 48
End: 2023-08-17

## 2023-08-22 ENCOUNTER — OUTPATIENT (OUTPATIENT)
Dept: OUTPATIENT SERVICES | Facility: HOSPITAL | Age: 48
LOS: 1 days | End: 2023-08-22
Payer: COMMERCIAL

## 2023-08-22 ENCOUNTER — APPOINTMENT (OUTPATIENT)
Dept: ULTRASOUND IMAGING | Facility: CLINIC | Age: 48
End: 2023-08-22
Payer: COMMERCIAL

## 2023-08-22 ENCOUNTER — APPOINTMENT (OUTPATIENT)
Dept: MAMMOGRAPHY | Facility: CLINIC | Age: 48
End: 2023-08-22
Payer: COMMERCIAL

## 2023-08-22 DIAGNOSIS — Z00.8 ENCOUNTER FOR OTHER GENERAL EXAMINATION: ICD-10-CM

## 2023-08-22 PROCEDURE — 77067 SCR MAMMO BI INCL CAD: CPT | Mod: 26

## 2023-08-22 PROCEDURE — 77067 SCR MAMMO BI INCL CAD: CPT

## 2023-08-22 PROCEDURE — 77063 BREAST TOMOSYNTHESIS BI: CPT | Mod: 26

## 2023-08-22 PROCEDURE — 76641 ULTRASOUND BREAST COMPLETE: CPT

## 2023-08-22 PROCEDURE — 77063 BREAST TOMOSYNTHESIS BI: CPT

## 2023-08-22 PROCEDURE — 76641 ULTRASOUND BREAST COMPLETE: CPT | Mod: 26,50

## 2023-09-13 ENCOUNTER — RX RENEWAL (OUTPATIENT)
Age: 48
End: 2023-09-13

## 2023-10-12 ENCOUNTER — APPOINTMENT (OUTPATIENT)
Dept: BARIATRICS/WEIGHT MGMT | Facility: CLINIC | Age: 48
End: 2023-10-12
Payer: COMMERCIAL

## 2023-10-12 PROCEDURE — 99205 OFFICE O/P NEW HI 60 MIN: CPT | Mod: 95

## 2023-11-06 ENCOUNTER — NON-APPOINTMENT (OUTPATIENT)
Age: 48
End: 2023-11-06

## 2023-11-06 ENCOUNTER — APPOINTMENT (OUTPATIENT)
Dept: INTERNAL MEDICINE | Facility: CLINIC | Age: 48
End: 2023-11-06
Payer: COMMERCIAL

## 2023-11-06 VITALS
HEART RATE: 84 BPM | OXYGEN SATURATION: 99 % | WEIGHT: 230 LBS | HEIGHT: 67 IN | SYSTOLIC BLOOD PRESSURE: 124 MMHG | DIASTOLIC BLOOD PRESSURE: 78 MMHG | TEMPERATURE: 97.9 F | BODY MASS INDEX: 36.1 KG/M2

## 2023-11-06 DIAGNOSIS — H90.3 SENSORINEURAL HEARING LOSS, BILATERAL: ICD-10-CM

## 2023-11-06 DIAGNOSIS — R09.89 OTHER SPECIFIED SYMPTOMS AND SIGNS INVOLVING THE CIRCULATORY AND RESPIRATORY SYSTEMS: ICD-10-CM

## 2023-11-06 DIAGNOSIS — K21.9 GASTRO-ESOPHAGEAL REFLUX DISEASE W/OUT ESOPHAGITIS: ICD-10-CM

## 2023-11-06 DIAGNOSIS — R03.0 ELEVATED BLOOD-PRESSURE READING, W/OUT DIAGNOSIS OF HYPERTENSION: ICD-10-CM

## 2023-11-06 DIAGNOSIS — R76.8 OTHER SPECIFIED ABNORMAL IMMUNOLOGICAL FINDINGS IN SERUM: ICD-10-CM

## 2023-11-06 DIAGNOSIS — Z00.00 ENCOUNTER FOR GENERAL ADULT MEDICAL EXAMINATION W/OUT ABNORMAL FINDINGS: ICD-10-CM

## 2023-11-06 PROCEDURE — 36415 COLL VENOUS BLD VENIPUNCTURE: CPT

## 2023-11-06 PROCEDURE — 93000 ELECTROCARDIOGRAM COMPLETE: CPT

## 2023-11-06 PROCEDURE — 99396 PREV VISIT EST AGE 40-64: CPT | Mod: 25

## 2023-11-07 LAB
25(OH)D3 SERPL-MCNC: 36.5 NG/ML
ALBUMIN SERPL ELPH-MCNC: 4.2 G/DL
ALP BLD-CCNC: 85 U/L
ALT SERPL-CCNC: 14 U/L
ANION GAP SERPL CALC-SCNC: 11 MMOL/L
APPEARANCE: CLEAR
AST SERPL-CCNC: 12 U/L
BACTERIA: ABNORMAL /HPF
BASOPHILS # BLD AUTO: 0.02 K/UL
BASOPHILS NFR BLD AUTO: 0.3 %
BILIRUB DIRECT SERPL-MCNC: 0.1 MG/DL
BILIRUB SERPL-MCNC: 0.5 MG/DL
BILIRUBIN URINE: NEGATIVE
BLOOD URINE: NEGATIVE
BUN SERPL-MCNC: 14 MG/DL
CALCIUM SERPL-MCNC: 9.7 MG/DL
CAST: 0 /LPF
CHLORIDE SERPL-SCNC: 104 MMOL/L
CHOLEST SERPL-MCNC: 211 MG/DL
CO2 SERPL-SCNC: 23 MMOL/L
COLOR: YELLOW
CREAT SERPL-MCNC: 0.99 MG/DL
EGFR: 71 ML/MIN/1.73M2
EOSINOPHIL # BLD AUTO: 0.05 K/UL
EOSINOPHIL NFR BLD AUTO: 0.8 %
EPITHELIAL CELLS: 6 /HPF
ESTIMATED AVERAGE GLUCOSE: 111 MG/DL
FERRITIN SERPL-MCNC: 34 NG/ML
GLUCOSE QUALITATIVE U: NEGATIVE MG/DL
GLUCOSE SERPL-MCNC: 85 MG/DL
HBA1C MFR BLD HPLC: 5.5 %
HCT VFR BLD CALC: 41.4 %
HCV AB SER QL: NONREACTIVE
HCV S/CO RATIO: 0.07 S/CO
HDLC SERPL-MCNC: 62 MG/DL
HGB BLD-MCNC: 13.6 G/DL
IMM GRANULOCYTES NFR BLD AUTO: 0.2 %
IRON SERPL-MCNC: 116 UG/DL
KETONES URINE: NEGATIVE MG/DL
LDLC SERPL CALC-MCNC: 126 MG/DL
LEUKOCYTE ESTERASE URINE: NEGATIVE
LYMPHOCYTES # BLD AUTO: 1.99 K/UL
LYMPHOCYTES NFR BLD AUTO: 30.9 %
MAN DIFF?: NORMAL
MCHC RBC-ENTMCNC: 28.5 PG
MCHC RBC-ENTMCNC: 32.9 GM/DL
MCV RBC AUTO: 86.8 FL
MICROSCOPIC-UA: NORMAL
MONOCYTES # BLD AUTO: 0.31 K/UL
MONOCYTES NFR BLD AUTO: 4.8 %
NEUTROPHILS # BLD AUTO: 4.07 K/UL
NEUTROPHILS NFR BLD AUTO: 63 %
NITRITE URINE: NEGATIVE
NONHDLC SERPL-MCNC: 149 MG/DL
PH URINE: 5.5
PLATELET # BLD AUTO: 307 K/UL
POTASSIUM SERPL-SCNC: 4.7 MMOL/L
PROT SERPL-MCNC: 7.2 G/DL
PROTEIN URINE: NEGATIVE MG/DL
RBC # BLD: 4.77 M/UL
RBC # FLD: 13.2 %
RED BLOOD CELLS URINE: 0 /HPF
SODIUM SERPL-SCNC: 138 MMOL/L
SPECIFIC GRAVITY URINE: 1.02
T4 SERPL-MCNC: 8.8 UG/DL
TRIGL SERPL-MCNC: 131 MG/DL
TSH SERPL-ACNC: 1.27 UIU/ML
UROBILINOGEN URINE: 0.2 MG/DL
WBC # FLD AUTO: 6.45 K/UL
WHITE BLOOD CELLS URINE: 0 /HPF

## 2023-12-07 ENCOUNTER — APPOINTMENT (OUTPATIENT)
Dept: BARIATRICS/WEIGHT MGMT | Facility: CLINIC | Age: 48
End: 2023-12-07
Payer: COMMERCIAL

## 2023-12-07 ENCOUNTER — OUTPATIENT (OUTPATIENT)
Dept: OUTPATIENT SERVICES | Facility: HOSPITAL | Age: 48
LOS: 1 days | End: 2023-12-07
Payer: COMMERCIAL

## 2023-12-07 DIAGNOSIS — I10 ESSENTIAL (PRIMARY) HYPERTENSION: ICD-10-CM

## 2023-12-07 DIAGNOSIS — E66.9 OBESITY, UNSPECIFIED: ICD-10-CM

## 2023-12-07 PROCEDURE — 99215 OFFICE O/P EST HI 40 MIN: CPT | Mod: 95

## 2023-12-07 PROCEDURE — G0463: CPT

## 2023-12-22 DIAGNOSIS — E66.9 OBESITY, UNSPECIFIED: ICD-10-CM

## 2023-12-22 DIAGNOSIS — E78.5 HYPERLIPIDEMIA, UNSPECIFIED: ICD-10-CM

## 2023-12-27 ENCOUNTER — RX RENEWAL (OUTPATIENT)
Age: 48
End: 2023-12-27

## 2024-02-01 ENCOUNTER — RX CHANGE (OUTPATIENT)
Age: 49
End: 2024-02-01

## 2024-02-01 RX ORDER — METOPROLOL SUCCINATE 25 MG/1
25 TABLET, EXTENDED RELEASE ORAL
Qty: 30 | Refills: 2 | Status: DISCONTINUED | COMMUNITY
Start: 2022-01-05 | End: 2024-02-01

## 2024-02-08 NOTE — ASSESSMENT
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  Therefore the cultures are updated every 3 days, however we will need some additional lab work to narrow down the type of fungal ball this is.      If you have time to contact her that would be great, I will place the orders in this encounter for what we need.   [FreeTextEntry1] : All preventative measures were reviewed with the patient and the patient is due for and agrees to the following as outlined  in the plan  below.\par

## 2024-03-01 ENCOUNTER — APPOINTMENT (OUTPATIENT)
Dept: BARIATRICS/WEIGHT MGMT | Facility: CLINIC | Age: 49
End: 2024-03-01

## 2024-05-10 ENCOUNTER — RX RENEWAL (OUTPATIENT)
Age: 49
End: 2024-05-10

## 2024-05-29 ENCOUNTER — APPOINTMENT (OUTPATIENT)
Age: 49
End: 2024-05-29
Payer: COMMERCIAL

## 2024-06-05 ENCOUNTER — APPOINTMENT (OUTPATIENT)
Dept: INTERNAL MEDICINE | Facility: CLINIC | Age: 49
End: 2024-06-05
Payer: COMMERCIAL

## 2024-06-05 ENCOUNTER — NON-APPOINTMENT (OUTPATIENT)
Age: 49
End: 2024-06-05

## 2024-06-05 VITALS
TEMPERATURE: 97.7 F | HEIGHT: 67 IN | DIASTOLIC BLOOD PRESSURE: 74 MMHG | OXYGEN SATURATION: 98 % | WEIGHT: 236 LBS | HEART RATE: 96 BPM | BODY MASS INDEX: 37.04 KG/M2 | SYSTOLIC BLOOD PRESSURE: 134 MMHG

## 2024-06-05 VITALS — DIASTOLIC BLOOD PRESSURE: 88 MMHG | SYSTOLIC BLOOD PRESSURE: 116 MMHG

## 2024-06-05 DIAGNOSIS — H93.19 TINNITUS, UNSPECIFIED EAR: ICD-10-CM
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
## 2024-06-05 DIAGNOSIS — F32.A ANXIETY DISORDER, UNSPECIFIED: ICD-10-CM

## 2024-06-05 DIAGNOSIS — F41.9 ANXIETY DISORDER, UNSPECIFIED: ICD-10-CM

## 2024-06-05 PROCEDURE — 93000 ELECTROCARDIOGRAM COMPLETE: CPT

## 2024-06-05 PROCEDURE — 99214 OFFICE O/P EST MOD 30 MIN: CPT

## 2024-06-05 RX ORDER — PHENTERMINE HYDROCHLORIDE 37.5 MG/1
37.5 TABLET ORAL
Qty: 30 | Refills: 5 | Status: DISCONTINUED | COMMUNITY
Start: 2023-10-12 | End: 2024-06-05

## 2024-06-05 RX ORDER — ALPRAZOLAM 0.25 MG/1
0.25 TABLET ORAL
Qty: 10 | Refills: 0 | Status: DISCONTINUED | COMMUNITY
Start: 2022-01-05 | End: 2024-06-05

## 2024-06-05 RX ORDER — TOPIRAMATE 25 MG/1
25 TABLET, FILM COATED ORAL
Qty: 30 | Refills: 5 | Status: DISCONTINUED | COMMUNITY
Start: 2023-10-12 | End: 2024-06-05

## 2024-06-05 RX ORDER — PANTOPRAZOLE 40 MG/1
40 TABLET, DELAYED RELEASE ORAL DAILY
Qty: 30 | Refills: 1 | Status: DISCONTINUED | COMMUNITY
Start: 2022-11-03 | End: 2024-06-05

## 2024-06-05 NOTE — PHYSICAL EXAM
[Normal] : affect was normal and insight and judgment were intact [de-identified] : No nystagmus.

## 2024-06-05 NOTE — HISTORY OF PRESENT ILLNESS
[FreeTextEntry1] : Follow up. [de-identified] : 49 year female presents to the office for follow up on HTN, HLD. Hx anxiety and depression, does not take any medications, has had therapist in the past, does not want meds or therapist at this time. HTN: Continues on Metoprolol 25mg daily, denies SE. Does not monitor BP at home. /88 taken by me. HLD: Elevated LDL on last blood work, not fasting today. Reports intermittent periods of dizziness/lightheadedness. Denies feeling like room spinning, reports feels lightheaded. Hx of tinnitus and vertigo. Has episodes while driving, walking, resolves once she goes outside or rolls down window and gets some air per patient. Denies syncopal episode in the past. Denies dizziness/lightheaded with positional changes. Also reports found tick on her head x1 week ago, denies rash, fever, fatigue, joint pain. Denies chest pain, palpitations, SOB.

## 2024-06-05 NOTE — REVIEW OF SYSTEMS
[Dizziness] : dizziness [Negative] : Psychiatric [Headache] : no headache [Fainting] : no fainting [Confusion] : no confusion [Unsteady Walking] : no ataxia

## 2024-06-05 NOTE — DATA REVIEWED
[FreeTextEntry1] :  EKG-NSR 79, no ST inversion/elevations. No change from previous EKG, reviewed with Dr Henry.

## 2024-06-06 ENCOUNTER — APPOINTMENT (OUTPATIENT)
Dept: CARDIOLOGY | Facility: CLINIC | Age: 49
End: 2024-06-06
Payer: COMMERCIAL

## 2024-06-06 VITALS
HEART RATE: 84 BPM | DIASTOLIC BLOOD PRESSURE: 84 MMHG | SYSTOLIC BLOOD PRESSURE: 130 MMHG | TEMPERATURE: 98 F | HEIGHT: 67 IN | WEIGHT: 230 LBS | OXYGEN SATURATION: 97 % | BODY MASS INDEX: 36.1 KG/M2

## 2024-06-06 DIAGNOSIS — E78.5 HYPERLIPIDEMIA, UNSPECIFIED: ICD-10-CM

## 2024-06-06 DIAGNOSIS — I10 ESSENTIAL (PRIMARY) HYPERTENSION: ICD-10-CM

## 2024-06-06 DIAGNOSIS — R42 DIZZINESS AND GIDDINESS: ICD-10-CM

## 2024-06-06 DIAGNOSIS — R07.89 OTHER CHEST PAIN: ICD-10-CM

## 2024-06-06 PROCEDURE — 93000 ELECTROCARDIOGRAM COMPLETE: CPT

## 2024-06-06 PROCEDURE — 99204 OFFICE O/P NEW MOD 45 MIN: CPT | Mod: 25

## 2024-06-13 ENCOUNTER — RX RENEWAL (OUTPATIENT)
Age: 49
End: 2024-06-13

## 2024-06-13 RX ORDER — METOPROLOL SUCCINATE 25 MG/1
25 TABLET, EXTENDED RELEASE ORAL
Qty: 90 | Refills: 0 | Status: ACTIVE | COMMUNITY
Start: 1900-01-01 | End: 1900-01-01

## 2024-06-17 PROBLEM — I10 HYPERTENSION: Status: ACTIVE | Noted: 2023-10-12

## 2024-06-17 PROBLEM — R07.89 ATYPICAL CHEST PAIN: Status: ACTIVE | Noted: 2022-11-03

## 2024-06-17 PROBLEM — R42 DIZZINESS: Status: ACTIVE | Noted: 2024-06-05

## 2024-06-17 PROBLEM — E78.5 HYPERLIPIDEMIA: Status: ACTIVE | Noted: 2023-10-12

## 2024-06-17 NOTE — HISTORY OF PRESENT ILLNESS
[FreeTextEntry1] : Ms. Espana is a 50 yo female with a hx hypertension, dyslipidemia presenting with dizziness.    She reports having a a cough and URI/viral sx about one month ago. Since then has been feeling dizzy described as "foggy" and room spinning sensation.   Denies chest pain, SOB, palpitations, syncope, LE edema.    Surgical hx: none  Tobacco use: never  Alcohol: rare  Drug use: none Caffeine: 1 cup coffee daily  Family hx: father with HTN, HLD, mom with HF (unclear etiology, age 70s)  Exercise: no formal  Ob hx: 2 pregnancies/children. c/b preeclampsia

## 2024-06-17 NOTE — REVIEW OF SYSTEMS
[SOB] : no shortness of breath [Dyspnea on exertion] : not dyspnea during exertion [Chest Discomfort] : no chest discomfort [Lower Ext Edema] : no extremity edema [Palpitations] : no palpitations [Orthopnea] : no orthopnea [PND] : no PND [Syncope] : no syncope [Dizziness] : dizziness [Negative] : Heme/Lymph [FreeTextEntry5] : +dizziness

## 2024-06-17 NOTE — ASSESSMENT
[FreeTextEntry1] : Dizziness  possibly vertigo/vestibular neuritis post URI  will order 1 week monitor to r/o arrhythmia   HTN  BP controlled on metoprolol   HLD    discussed diet changes, no indication for medical tx at this time    will f/u as needed based on above findings.

## 2024-07-09 ENCOUNTER — NON-APPOINTMENT (OUTPATIENT)
Age: 49
End: 2024-07-09

## 2024-08-26 ENCOUNTER — RX RENEWAL (OUTPATIENT)
Age: 49
End: 2024-08-26

## 2024-09-06 ENCOUNTER — APPOINTMENT (OUTPATIENT)
Dept: INTERNAL MEDICINE | Facility: CLINIC | Age: 49
End: 2024-09-06

## 2024-09-13 ENCOUNTER — APPOINTMENT (OUTPATIENT)
Dept: INTERNAL MEDICINE | Facility: CLINIC | Age: 49
End: 2024-09-13
Payer: COMMERCIAL

## 2024-09-13 VITALS
TEMPERATURE: 97.9 F | HEIGHT: 67 IN | HEART RATE: 80 BPM | OXYGEN SATURATION: 99 % | BODY MASS INDEX: 36.73 KG/M2 | DIASTOLIC BLOOD PRESSURE: 82 MMHG | WEIGHT: 234 LBS | SYSTOLIC BLOOD PRESSURE: 118 MMHG

## 2024-09-13 DIAGNOSIS — E78.5 HYPERLIPIDEMIA, UNSPECIFIED: ICD-10-CM

## 2024-09-13 DIAGNOSIS — I10 ESSENTIAL (PRIMARY) HYPERTENSION: ICD-10-CM

## 2024-09-13 DIAGNOSIS — E66.9 OBESITY, UNSPECIFIED: ICD-10-CM

## 2024-09-13 PROCEDURE — 99214 OFFICE O/P EST MOD 30 MIN: CPT

## 2024-09-13 NOTE — PHYSICAL EXAM
[No Acute Distress] : no acute distress [Well-Appearing] : well-appearing [Normal Sclera/Conjunctiva] : normal sclera/conjunctiva [No Respiratory Distress] : no respiratory distress  [Clear to Auscultation] : lungs were clear to auscultation bilaterally [Normal Rate] : normal rate  [Normal S1, S2] : normal S1 and S2 [No Edema] : there was no peripheral edema [Normal Gait] : normal gait [Normal Affect] : the affect was normal

## 2024-09-13 NOTE — HISTORY OF PRESENT ILLNESS
[FreeTextEntry1] : Follow-up [de-identified] : 49-year-old female past medical history of anxiety mild hypertension here for routine follow-up for blood pressure check  She reports she has some allergy was treated initially with topical and oral steroids now currently on Allegra and Zyrtec by her dermatology, mild suspicion for rheumatological issue, dermatologist will trial medication and see how she does.  She has not been taking any weight loss medications since last year, as Wegov[employer] was not in the market She wishes to change her weight management Dr, Wants to trial weight loss medications once her rash has resolved

## 2024-09-20 ENCOUNTER — APPOINTMENT (OUTPATIENT)
Dept: ULTRASOUND IMAGING | Facility: CLINIC | Age: 49
End: 2024-09-20
Payer: COMMERCIAL

## 2024-09-20 ENCOUNTER — APPOINTMENT (OUTPATIENT)
Dept: MAMMOGRAPHY | Facility: CLINIC | Age: 49
End: 2024-09-20
Payer: COMMERCIAL

## 2024-09-20 ENCOUNTER — OUTPATIENT (OUTPATIENT)
Dept: OUTPATIENT SERVICES | Facility: HOSPITAL | Age: 49
LOS: 1 days | End: 2024-09-20
Payer: COMMERCIAL

## 2024-09-20 DIAGNOSIS — N60.19 DIFFUSE CYSTIC MASTOPATHY OF UNSPECIFIED BREAST: ICD-10-CM

## 2024-09-20 DIAGNOSIS — Z00.8 ENCOUNTER FOR OTHER GENERAL EXAMINATION: ICD-10-CM

## 2024-09-20 DIAGNOSIS — Z12.31 ENCOUNTER FOR SCREENING MAMMOGRAM FOR MALIGNANT NEOPLASM OF BREAST: ICD-10-CM

## 2024-09-20 PROCEDURE — 77067 SCR MAMMO BI INCL CAD: CPT

## 2024-09-20 PROCEDURE — 77063 BREAST TOMOSYNTHESIS BI: CPT

## 2024-09-20 PROCEDURE — 77063 BREAST TOMOSYNTHESIS BI: CPT | Mod: 26

## 2024-09-20 PROCEDURE — 76641 ULTRASOUND BREAST COMPLETE: CPT | Mod: 26,50

## 2024-09-20 PROCEDURE — 76641 ULTRASOUND BREAST COMPLETE: CPT

## 2024-09-20 PROCEDURE — 77067 SCR MAMMO BI INCL CAD: CPT | Mod: 26

## 2024-11-27 ENCOUNTER — APPOINTMENT (OUTPATIENT)
Dept: INTERNAL MEDICINE | Facility: CLINIC | Age: 49
End: 2024-11-27
Payer: COMMERCIAL

## 2024-11-27 ENCOUNTER — NON-APPOINTMENT (OUTPATIENT)
Age: 49
End: 2024-11-27

## 2024-11-27 VITALS
HEART RATE: 58 BPM | HEIGHT: 67 IN | BODY MASS INDEX: 37.67 KG/M2 | DIASTOLIC BLOOD PRESSURE: 78 MMHG | SYSTOLIC BLOOD PRESSURE: 108 MMHG | TEMPERATURE: 97.9 F | WEIGHT: 240 LBS | OXYGEN SATURATION: 98 %

## 2024-11-27 DIAGNOSIS — R42 DIZZINESS AND GIDDINESS: ICD-10-CM

## 2024-11-27 DIAGNOSIS — R76.8 OTHER SPECIFIED ABNORMAL IMMUNOLOGICAL FINDINGS IN SERUM: ICD-10-CM

## 2024-11-27 DIAGNOSIS — F32.A ANXIETY DISORDER, UNSPECIFIED: ICD-10-CM

## 2024-11-27 DIAGNOSIS — K21.9 GASTRO-ESOPHAGEAL REFLUX DISEASE W/OUT ESOPHAGITIS: ICD-10-CM

## 2024-11-27 DIAGNOSIS — M54.50 LOW BACK PAIN, UNSPECIFIED: ICD-10-CM

## 2024-11-27 DIAGNOSIS — R07.89 OTHER CHEST PAIN: ICD-10-CM

## 2024-11-27 DIAGNOSIS — R79.89 OTHER SPECIFIED ABNORMAL FINDINGS OF BLOOD CHEMISTRY: ICD-10-CM

## 2024-11-27 DIAGNOSIS — R03.0 ELEVATED BLOOD-PRESSURE READING, W/OUT DIAGNOSIS OF HYPERTENSION: ICD-10-CM

## 2024-11-27 DIAGNOSIS — H93.293 OTHER ABNORMAL AUDITORY PERCEPTIONS, BILATERAL: ICD-10-CM

## 2024-11-27 DIAGNOSIS — F41.9 ANXIETY DISORDER, UNSPECIFIED: ICD-10-CM

## 2024-11-27 DIAGNOSIS — I10 ESSENTIAL (PRIMARY) HYPERTENSION: ICD-10-CM

## 2024-11-27 DIAGNOSIS — Z00.00 ENCOUNTER FOR GENERAL ADULT MEDICAL EXAMINATION W/OUT ABNORMAL FINDINGS: ICD-10-CM

## 2024-11-27 DIAGNOSIS — R09.89 OTHER SPECIFIED SYMPTOMS AND SIGNS INVOLVING THE CIRCULATORY AND RESPIRATORY SYSTEMS: ICD-10-CM

## 2024-11-27 DIAGNOSIS — E78.5 HYPERLIPIDEMIA, UNSPECIFIED: ICD-10-CM

## 2024-11-27 PROCEDURE — 36415 COLL VENOUS BLD VENIPUNCTURE: CPT

## 2024-11-27 PROCEDURE — 99396 PREV VISIT EST AGE 40-64: CPT

## 2024-11-27 PROCEDURE — 93000 ELECTROCARDIOGRAM COMPLETE: CPT

## 2024-11-27 RX ORDER — SEMAGLUTIDE 0.25 MG/.5ML
0.25 INJECTION, SOLUTION SUBCUTANEOUS
Qty: 1 | Refills: 0 | Status: ACTIVE | COMMUNITY
Start: 2024-11-27

## 2024-12-01 LAB
ALBUMIN SERPL ELPH-MCNC: 4.2 G/DL
AMYLASE/CREAT SERPL: 51 U/L
ANION GAP SERPL CALC-SCNC: 13 MMOL/L
APPEARANCE: CLEAR
BACTERIA UR CULT: NORMAL
BACTERIA: ABNORMAL /HPF
BILIRUBIN URINE: NEGATIVE
BLOOD URINE: NEGATIVE
BUN SERPL-MCNC: 14 MG/DL
CALCIUM SERPL-MCNC: 9.2 MG/DL
CAST: 1 /LPF
CHLORIDE SERPL-SCNC: 107 MMOL/L
CO2 SERPL-SCNC: 21 MMOL/L
COLOR: YELLOW
CREAT SERPL-MCNC: 0.96 MG/DL
EGFR: 73 ML/MIN/1.73M2
EPITHELIAL CELLS: 3 /HPF
GLUCOSE QUALITATIVE U: NEGATIVE MG/DL
GLUCOSE SERPL-MCNC: 88 MG/DL
KETONES URINE: NEGATIVE MG/DL
LEUKOCYTE ESTERASE URINE: NEGATIVE
LPL SERPL-CCNC: 29 U/L
MICROSCOPIC-UA: NORMAL
NITRITE URINE: NEGATIVE
PH URINE: 6
PHOSPHATE SERPL-MCNC: 3.4 MG/DL
POTASSIUM SERPL-SCNC: 4.9 MMOL/L
PROTEIN URINE: NEGATIVE MG/DL
RED BLOOD CELLS URINE: 2 /HPF
SODIUM SERPL-SCNC: 140 MMOL/L
SPECIFIC GRAVITY URINE: 1.02
UROBILINOGEN URINE: 0.2 MG/DL
WHITE BLOOD CELLS URINE: 1 /HPF

## 2024-12-04 ENCOUNTER — NON-APPOINTMENT (OUTPATIENT)
Age: 49
End: 2024-12-04